# Patient Record
Sex: MALE | Race: BLACK OR AFRICAN AMERICAN
[De-identification: names, ages, dates, MRNs, and addresses within clinical notes are randomized per-mention and may not be internally consistent; named-entity substitution may affect disease eponyms.]

---

## 2017-11-27 ENCOUNTER — HOSPITAL ENCOUNTER (EMERGENCY)
Dept: HOSPITAL 56 - MW.ED | Age: 32
Discharge: HOME | End: 2017-11-27
Payer: COMMERCIAL

## 2017-11-27 VITALS — DIASTOLIC BLOOD PRESSURE: 56 MMHG | SYSTOLIC BLOOD PRESSURE: 156 MMHG

## 2017-11-27 DIAGNOSIS — F17.210: ICD-10-CM

## 2017-11-27 DIAGNOSIS — Z79.899: ICD-10-CM

## 2017-11-27 DIAGNOSIS — B34.9: ICD-10-CM

## 2017-11-27 DIAGNOSIS — R07.89: Primary | ICD-10-CM

## 2017-11-27 NOTE — CR
EXAM DATE: 17



PATIENT'S AGE: 32



Patient: VIV CARRERO



Facility: [place], [place]

Patient ID: [id number]

Site Patient ID: [id number].

Site Accession #: [id number].

: 1985

Study: XRay Chest [id number]/27/2017 6:30:52 AM

Ordering Physician: Doctor Roberts



Final Report: 

INDICATION:

Chest pain.



Comparison:

Chest radiograph May 14, 2014.



Technique:

Two-view chest.



Findings:

Normal size cardiac silhouette. Clear lung fields without evidence of acute 
pneumonic infiltrates or CHF. No pneumothorax or pleural effusion. No interval 
change.



Impression:

Negative chest radiograph.

1. No acute pathology.

2. No interval change.





Dictated by Tan Rosas MD @ 2017 6:49AM

(Electronic Signature)





Report Signed by Proxy.
DAVID

## 2017-11-27 NOTE — EDM.PDOC
ED HPI GENERAL MEDICAL PROBLEM





- General


Chief Complaint: Chest Pain


Stated Complaint: CHEST PAIN


Time Seen by Provider: 11/27/17 07:15





- History of Present Illness


INITIAL COMMENTS - FREE TEXT/NARRATIVE: 


HISTORY AND PHYSICAL:





History of present illness:


Patient 32-year-old male presents with concern of cough chest pain denies fever 

chills nausea vomiting denies associated palpitations shortness breath 

diaphoresis


Review of systems: 


As per history of present illness and below otherwise all systems reviewed and 

negative.





Past medical history: 


As per history of present illness and as reviewed below otherwise 

noncontributory.





Surgical history: 


As per history of present illness and as reviewed below otherwise 

noncontributory.





Social history: 


No reported history of drug or alcohol abuse.





Family history: 


As per history of present illness and as reviewed below otherwise 

noncontributory.





Physical exam:


HEENT: Atraumatic, normocephalic, pupils reactive, negative for conjunctival 

pallor or scleral icterus, mucous membranes moist, throat clear, neck supple, 

nontender, trachea midline.


Lungs: Clear to auscultation, breath sounds equal bilaterally, chest nontender.


Heart: S1S2, regular, negative for clicks, rubs, or JVD.


Abdomen: Soft, nondistended, nontender. Negative for masses or 

hepatosplenomegaly. Negative for costovertebral tenderness.


Pelvis: Stable nontender.


Genitourinary: Deferred.


Rectal: Deferred.


Extremities: Atraumatic, negative for cords or calf pain. Neurovascular 

unremarkable.


Neuro: Awake, alert, oriented. Cranial nerves II through XII unremarkable. 

Cerebellum unremarkable. Motor and sensory unremarkable throughout. Exam 

nonfocal.





Diagnostics:


CBC chest x-ray influenza screen





Therapeutics:


None





Impression: 


#1 atypical chest #2 viral syndrome





Definitive disposition and diagnosis as appropriate pending reevaluation and 

review of above.





  ** Left Chest


Pain Score (Numeric/FACES): 6





- Related Data


 Allergies











Allergy/AdvReac Type Severity Reaction Status Date / Time


 


No Known Allergies Allergy   Verified 11/27/17 05:39











Home Meds: 


 Home Meds





Ibuprofen 200 mg PO DAILY PRN 11/27/17 [History]











Past Medical History





- Past Health History


Medical/Surgical History: Denies Medical/Surgical History


HEENT History: Reports: None


Cardiovascular History: Reports: None


Respiratory History: Reports: None


Gastrointestinal History: Reports: None


Genitourinary History: Reports: None


Musculoskeletal History: Reports: None


Neurological History: Reports: None


Psychiatric History: Reports: None


Endocrine/Metabolic History: Reports: None


Hematologic History: Reports: None


Immunologic History: Reports: None


Oncologic (Cancer) History: Reports: None


Dermatologic History: Reports: None





- Past Surgical History


Head Surgeries/Procedures: Reports: None


HEENT Surgical History: Reports: None


Cardiovascular Surgical History: Reports: None


Respiratory Surgical History: Reports: None


GI Surgical History: Reports: None


Male  Surgical History: Reports: None


Endocrine Surgical History: Reports: None


Neurological Surgical History: Reports: None


Musculoskeletal Surgical History: Reports: Other (See Below)


Other Musculoskeletal Surgeries/Procedures:: right knee surgery


Dermatological Surgical History: Reports: None





Social & Family History





- Family History


Family Medical History: Noncontributory





- Tobacco Use


Smoking Status *Q: Current Every Day Smoker


Years of Tobacco use: 15


Packs/Tins Daily: 0.2


Second Hand Smoke Exposure: Yes





- Caffeine Use


Caffeine Use: Reports: None





- Alcohol Use


Days Per Week of Alcohol Use: 0





- Recreational Drug Use


Recreational Drug Use: No





ED ROS GENERAL





- Review of Systems


Review Of Systems: ROS reveals no pertinent complaints other than HPI.





ED EXAM, GENERAL





- Physical Exam


Exam: See Below (See dictation)





Course





- Vital Signs


Last Recorded V/S: 


 Last Vital Signs











Temp  36.2 C   11/27/17 05:40


 


Pulse  65   11/27/17 05:40


 


Resp  16   11/27/17 05:40


 


BP  143/72 H  11/27/17 05:40


 


Pulse Ox  98   11/27/17 05:40














- Orders/Labs/Meds


Orders: 


 Active Orders 24 hr











 Category Date Time Status


 


 EKG Documentation Completion [RC] STAT Care  11/27/17 05:45 Active


 


 Chest 2V [CR] Stat Exams  11/27/17 05:45 Taken











Labs: 


 Laboratory Tests











  11/27/17 Range/Units





  06:02 


 


WBC  4.93  (4.0-11.0)  K/uL


 


RBC  5.56  (4.50-5.90)  M/uL


 


Hgb  13.3  (13.0-17.0)  g/dL


 


Hct  41.9  (38.0-50.0)  %


 


MCV  75.4 L  (80.0-98.0)  fL


 


MCH  23.9 L  (27.0-32.0)  pg


 


MCHC  31.7  (31.0-37.0)  g/dL


 


RDW Std Deviation  37.2  (28.0-62.0)  fl


 


RDW Coeff of Leslie  14  (11.0-15.0)  %


 


Plt Count  157  (150-400)  K/uL


 


MPV  10.90  (7.40-12.00)  fL


 


Neut % (Auto)  47.7 L  (48.0-80.0)  %


 


Lymph % (Auto)  44.8 H  (16.0-40.0)  %


 


Mono % (Auto)  6.1  (0.0-15.0)  %


 


Eos % (Auto)  1.0  (0.0-7.0)  %


 


Baso % (Auto)  0.4  (0.0-1.5)  %


 


Neut # (Auto)  2.4  (1.4-5.7)  K/uL


 


Lymph # (Auto)  2.2  (0.6-2.4)  K/uL


 


Mono # (Auto)  0.3  (0.0-0.8)  K/uL


 


Eos # (Auto)  0.1  (0.0-0.7)  K/uL


 


Baso # (Auto)  0.0  (0.0-0.1)  K/uL


 


Nucleated RBC %  0.0  /100WBC


 


Nucleated RBCs #  0  K/uL














Departure





- Departure


Time of Disposition: 07:14


Disposition: Home, Self-Care 01


Condition: Good


Clinical Impression: 


 Atypical chest pain, Viral syndrome








- Discharge Information


Referrals: 


PCP,None [Primary Care Provider] - 


Forms:  ED Department Discharge


Additional Instructions: 


The following information is given to patients seen in the emergency department 

who are being discharged to home. This information is to outline your options 

for follow-up care. We provide all patients seen in our emergency department 

with a follow-up referral.





The need for follow-up, as well as the timing and circumstances, are variable 

depending upon the specifics of your emergency department visit.





If you don't have a primary care physician on staff, we will provide you with a 

referral. We always advise you to contact your personal physician following an 

emergency department visit to inform them of the circumstance of the visit and 

for follow-up with them and/or the need for any referrals to a consulting 

specialist.





The emergency department will also refer you to a specialist when appropriate. 

This referral assures that you have the opportunity for followup care with a 

specialist. All of these measure are taken in an effort to provide you with 

optimal care, which includes your followup.





Under all circumstances we always encourage you to contact your private 

physician who remains a resource for coordinating  your care. When calling for 

followup care, please make the office aware that this follow-up is from your 

recent emergency room visit. If for any reason you are refused follow-up, 

please contact the [facility] emergency department at [phone] 

and asked to speak to the emergency department charge nurse.

















[facility]


Primary Care


[address] [place] [address]


Phone: [phone]


Fax: [phone]











Follow-up primary medical doctor and/or clinic above was discussed albuterol as 

directed Motrin or Tylenol as directed return as needed as discussed





- My Orders


Last 24 Hours: 


My Active Orders





11/27/17 05:45


EKG Documentation Completion [RC] STAT 


Chest 2V [CR] Stat 














- Assessment/Plan


Last 24 Hours: 


My Active Orders





11/27/17 05:45


EKG Documentation Completion [RC] STAT 


Chest 2V [CR] Stat

## 2019-06-15 ENCOUNTER — HOSPITAL ENCOUNTER (EMERGENCY)
Dept: HOSPITAL 56 - MW.ED | Age: 34
LOS: 1 days | Discharge: HOME | End: 2019-06-16
Payer: COMMERCIAL

## 2019-06-15 DIAGNOSIS — Z91.14: ICD-10-CM

## 2019-06-15 DIAGNOSIS — I10: ICD-10-CM

## 2019-06-15 DIAGNOSIS — R07.89: Primary | ICD-10-CM

## 2019-06-15 PROCEDURE — 85025 COMPLETE CBC W/AUTO DIFF WBC: CPT

## 2019-06-15 PROCEDURE — 71045 X-RAY EXAM CHEST 1 VIEW: CPT

## 2019-06-15 PROCEDURE — 84484 ASSAY OF TROPONIN QUANT: CPT

## 2019-06-15 PROCEDURE — 80053 COMPREHEN METABOLIC PANEL: CPT

## 2019-06-15 PROCEDURE — 99285 EMERGENCY DEPT VISIT HI MDM: CPT

## 2019-06-15 PROCEDURE — 85610 PROTHROMBIN TIME: CPT

## 2019-06-15 PROCEDURE — 93005 ELECTROCARDIOGRAM TRACING: CPT

## 2019-06-15 NOTE — EDM.PDOC
ED HPI GENERAL MEDICAL PROBLEM





- General


Stated Complaint: CHEST PAIN


Time Seen by Provider: 06/15/19 23:11





- History of Present Illness


INITIAL COMMENTS - FREE TEXT/NARRATIVE: 





HISTORY AND PHYSICAL:





History of present illness:


Patient is a 33-year-old black male with history of hypertension who is none 

medications currently presents with a concern of chest pain he states this is 

right-sided he described it as sharp equivocates regarding shortness of breath 

or radiation of the no palpitations nausea or vomiting or diaphoresis.





Review of systems: 


As per history of present illness and below otherwise all systems reviewed and 

negative.





Past medical history: 


As per history of present illness and as reviewed below otherwise 

noncontributory.





Surgical history: 


As per history of present illness and as reviewed below otherwise 

noncontributory.





Social history: 


No reported history of drug or alcohol abuse.





Family history: 


As per history of present illness and as reviewed below otherwise 

noncontributory.





Physical exam:


HEENT: Atraumatic, normocephalic, pupils reactive, negative for conjunctival 

pallor or scleral icterus, mucous membranes moist, throat clear, neck supple, 

nontender, trachea midline.


Lungs: Clear to auscultation, breath sounds equal bilaterally, chest nontender.


Heart: S1S2, regular, negative for clicks, rubs, or JVD.


Abdomen: Soft, nondistended, nontender. Negative for masses or 

hepatosplenomegaly. Negative for costovertebral tenderness.


Pelvis: Stable nontender.


Genitourinary: Deferred.


Rectal: Deferred.


Extremities: Atraumatic, negative for cords or calf pain. Neurovascular 

unremarkable.


Neuro: Awake, alert, oriented. Cranial nerves II through XII unremarkable. 

Cerebellum unremarkable. Motor and sensory unremarkable throughout. Exam 

nonfocal.





Diagnostics:


CBC CMP troponin PT/INR chest x-ray EKG





Therapeutics:


IV O2 monitor aspirin 324 mg by mouth





Impression: 


1 atypical chest pain #2 history of hypertension





Definitive disposition and diagnosis as appropriate pending reevaluation and 

review of above.


  ** Chest


Pain Score (Numeric/FACES): 2





- Related Data


 Allergies











Allergy/AdvReac Type Severity Reaction Status Date / Time


 


No Known Allergies Allergy   Verified 06/15/19 23:21











Home Meds: 


 Home Meds





. [No Known Home Meds]  06/15/19 [History]











Past Medical History





- Past Health History


Medical/Surgical History: Denies Medical/Surgical History


HEENT History: Reports: None


Cardiovascular History: Reports: None


Respiratory History: Reports: None


Gastrointestinal History: Reports: None


Genitourinary History: Reports: None


Musculoskeletal History: Reports: None


Neurological History: Reports: None


Psychiatric History: Reports: None


Endocrine/Metabolic History: Reports: None


Hematologic History: Reports: None


Immunologic History: Reports: None


Oncologic (Cancer) History: Reports: None


Dermatologic History: Reports: None





- Past Surgical History


Head Surgeries/Procedures: Reports: None


HEENT Surgical History: Reports: None


Cardiovascular Surgical History: Reports: None


Respiratory Surgical History: Reports: None


GI Surgical History: Reports: None


Male  Surgical History: Reports: None


Endocrine Surgical History: Reports: None


Neurological Surgical History: Reports: None


Musculoskeletal Surgical History: Reports: Other (See Below)


Other Musculoskeletal Surgeries/Procedures:: right knee surgery


Dermatological Surgical History: Reports: None





Social & Family History





- Family History


Family Medical History: Noncontributory





- Caffeine Use


Caffeine Use: Reports: None





ED ROS GENERAL





- Review of Systems


Review Of Systems: ROS reveals no pertinent complaints other than HPI.





ED EXAM, GENERAL





- Physical Exam


Exam: See Below (The dictation)





Course





- Vital Signs


Text/Narrative:: 





Patient remains pain-free in the emergency department I discussed with patient 

admission for observation patient refuses agrees to follow-up as outpatient. 

Patient understands associated risk all labs  x-ray and EKG reviewed with 

patient


Last Recorded V/S: 


 Last Vital Signs











Temp  35.5 C   06/15/19 23:18


 


Pulse  71   06/16/19 00:05


 


Resp  17   06/16/19 00:05


 


BP  143/80 H  06/16/19 00:05


 


Pulse Ox  96   06/16/19 00:05














- Orders/Labs/Meds


Orders: 


 Active Orders 24 hr











 Category Date Time Status


 


 Cardiac Monitoring [RC] .AS DIRECTED Care  06/15/19 23:16 Active


 


 EKG Documentation Completion [RC] STAT Care  06/15/19 23:16 Active


 


 Sodium Chloride 0.9% [Saline Flush] Med  06/15/19 23:16 Active





 10 ml FLUSH ASDIRECTED PRN   


 


 Sodium Chloride 0.9% [Saline Flush] Med  06/15/19 23:16 Active





 2.5 ml FLUSH ASDIRECTED PRN   


 


 Saline Lock Insert [OM.PC] Stat Oth  06/15/19 23:16 Ordered








 Medication Orders





Sodium Chloride (Saline Flush)  10 ml FLUSH ASDIRECTED PRN


   PRN Reason: Keep Vein Open


Sodium Chloride (Saline Flush)  2.5 ml FLUSH ASDIRECTED PRN


   PRN Reason: Keep Vein Open








Labs: 


 Laboratory Tests











  06/15/19 06/15/19 06/15/19 Range/Units





  23:20 23:20 23:20 


 


WBC  6.52    (4.0-11.0)  K/uL


 


RBC  5.53    (4.50-5.90)  M/uL


 


Hgb  13.4    (13.0-17.0)  g/dL


 


Hct  41.8    (38.0-50.0)  %


 


MCV  75.6 L    (80.0-98.0)  fL


 


MCH  24.2 L    (27.0-32.0)  pg


 


MCHC  32.1    (31.0-37.0)  g/dL


 


RDW Std Deviation  37.1    (28.0-62.0)  fl


 


RDW Coeff of Leslie  13    (11.0-15.0)  %


 


Plt Count  159    (150-400)  K/uL


 


MPV  10.70    (7.40-12.00)  fL


 


Neut % (Auto)  46.9 L    (48.0-80.0)  %


 


Lymph % (Auto)  44.5 H    (16.0-40.0)  %


 


Mono % (Auto)  6.6    (0.0-15.0)  %


 


Eos % (Auto)  1.5    (0.0-7.0)  %


 


Baso % (Auto)  0.5    (0.0-1.5)  %


 


Neut # (Auto)  3.1    (1.4-5.7)  K/uL


 


Lymph # (Auto)  2.9 H    (0.6-2.4)  K/uL


 


Mono # (Auto)  0.4    (0.0-0.8)  K/uL


 


Eos # (Auto)  0.1    (0.0-0.7)  K/uL


 


Baso # (Auto)  0.0    (0.0-0.1)  K/uL


 


Nucleated RBC %  0.0    /100WBC


 


Nucleated RBCs #  0    K/uL


 


INR   1.07   


 


Sodium    138  (136-148)  mmol/L


 


Potassium    4.0  (3.5-5.1)  mmol/L


 


Chloride    105  ()  mmol/L


 


Carbon Dioxide    27.2  (21.0-32.0)  mmol/L


 


BUN    14  (7.0-18.0)  mg/dL


 


Creatinine    1.1  (0.8-1.3)  mg/dL


 


Est Cr Clr Drug Dosing    115.23  mL/min


 


Estimated GFR (MDRD)    > 60.0  ml/min


 


Glucose    103  ()  mg/dL


 


Calcium    8.9  (8.5-10.1)  mg/dL


 


Total Bilirubin    0.4  (0.2-1.0)  mg/dL


 


AST    29  (15-37)  IU/L


 


ALT    54  (14-63)  IU/L


 


Alkaline Phosphatase    44 L  ()  U/L


 


Troponin I    < 0.050  (0.000-0.056)  ng/mL


 


Total Protein    7.4  (6.4-8.2)  g/dL


 


Albumin    3.9  (3.4-5.0)  g/dL


 


Globulin    3.5  (2.6-4.0)  g/dL


 


Albumin/Globulin Ratio    1.1  (0.9-1.6)  











Meds: 


Medications











Generic Name Dose Route Start Last Admin





  Trade Name Freq  PRN Reason Stop Dose Admin


 


Sodium Chloride  10 ml  06/15/19 23:16  





  Saline Flush  FLUSH   





  ASDIRECTED PRN   





  Keep Vein Open   





     





     





     


 


Sodium Chloride  2.5 ml  06/15/19 23:16  





  Saline Flush  FLUSH   





  ASDIRECTED PRN   





  Keep Vein Open   





     





     





     














Discontinued Medications














Generic Name Dose Route Start Last Admin





  Trade Name Freq  PRN Reason Stop Dose Admin


 


Aspirin  324 mg  06/15/19 23:16  06/15/19 23:21





  Aspirin  PO  06/15/19 23:17  324 mg





  ONETIME ONE   Administration





     





     





     





     














Departure





- Departure


Time of Disposition: 01:02


Disposition: Home, Self-Care 01


Condition: Undetermined


Clinical Impression: 


 Chest pain, Medical non-compliance








- Discharge Information


Referrals: 


PCP,None [Primary Care Provider] - 


Additional Instructions: 


The following information is given to patients seen in the emergency department 

who are being discharged to home. This information is to outline your options 

for follow-up care. We provide all patients seen in our emergency department 

with a follow-up referral.





The need for follow-up, as well as the timing and circumstances, are variable 

depending upon the specifics of your emergency department visit.





If you don't have a primary care physician on staff, we will provide you with a 

referral. We always advise you to contact your personal physician following an 

emergency department visit to inform them of the circumstance of the visit and 

for follow-up with them and/or the need for any referrals to a consulting 

specialist.





The emergency department will also refer you to a specialist when appropriate. 

This referral assures that you have the opportunity for followup care with a 

specialist. All of these measure are taken in an effort to provide you with 

optimal care, which includes your followup.





Under all circumstances we always encourage you to contact your private 

physician who remains a resource for coordinating  your care. When calling for 

followup care, please make the office aware that this follow-up is from your 

recent emergency room visit. If for any reason you are refused follow-up, 

please contact the [facility] emergency department at [phone] 

and asked to speak to the emergency department charge nurse.











[facility]


Primary Care


[address] [place] [address]


Phone: [phone]


Fax: [phone]











Follow-up primary care above aspirin daily return as needed as discussed





- My Orders


Last 24 Hours: 


My Active Orders





06/15/19 23:16


Cardiac Monitoring [RC] .AS DIRECTED 


EKG Documentation Completion [RC] STAT 


Sodium Chloride 0.9% [Saline Flush]   10 ml FLUSH ASDIRECTED PRN 


Sodium Chloride 0.9% [Saline Flush]   2.5 ml FLUSH ASDIRECTED PRN 


Saline Lock Insert [OM.PC] Stat 














- Assessment/Plan


Last 24 Hours: 


My Active Orders





06/15/19 23:16


Cardiac Monitoring [RC] .AS DIRECTED 


EKG Documentation Completion [RC] STAT 


Sodium Chloride 0.9% [Saline Flush]   10 ml FLUSH ASDIRECTED PRN 


Sodium Chloride 0.9% [Saline Flush]   2.5 ml FLUSH ASDIRECTED PRN 


Saline Lock Insert [OM.PC] Stat

## 2019-06-15 NOTE — CR
INDICATION: chest pain



TECHNIQUE: Chest radiograph 1 view



COMPARISON: 11/27/17



FINDINGS: 



Mediastinum: The mediastinum is normal in appearance. The heart silhouette 

is normal in size and morphology. 



Lung: Both lungs are unremarkable in appearance. No sign of pleural 

effusion seen. No pneumothorax is identified. 







IMPRESSION: 



1. No acute cardiopulmonary disease is seen. 



Dictated by: Abbe Burden MD @ 06/15/2019 23:44:39



(Electronically Signed)

## 2019-06-16 VITALS — SYSTOLIC BLOOD PRESSURE: 126 MMHG | DIASTOLIC BLOOD PRESSURE: 70 MMHG

## 2019-06-16 LAB
CHLORIDE SERPL-SCNC: 105 MMOL/L (ref 98–107)
SODIUM SERPL-SCNC: 138 MMOL/L (ref 136–148)